# Patient Record
Sex: MALE | URBAN - METROPOLITAN AREA
[De-identification: names, ages, dates, MRNs, and addresses within clinical notes are randomized per-mention and may not be internally consistent; named-entity substitution may affect disease eponyms.]

---

## 2018-12-17 ENCOUNTER — OFFICE VISIT - HEALTHEAST (OUTPATIENT)
Dept: GERIATRICS | Facility: CLINIC | Age: 83
End: 2018-12-17

## 2018-12-17 DIAGNOSIS — N40.1 BENIGN PROSTATIC HYPERPLASIA WITH LOWER URINARY TRACT SYMPTOMS, SYMPTOM DETAILS UNSPECIFIED: ICD-10-CM

## 2018-12-17 DIAGNOSIS — I48.20 CHRONIC ATRIAL FIBRILLATION (H): ICD-10-CM

## 2018-12-17 DIAGNOSIS — H91.90 DECREASED HEARING, UNSPECIFIED LATERALITY: ICD-10-CM

## 2018-12-17 DIAGNOSIS — K27.9 PEPTIC ULCER DISEASE: ICD-10-CM

## 2018-12-17 DIAGNOSIS — S49.91XD INJURY OF RIGHT SHOULDER, SUBSEQUENT ENCOUNTER: ICD-10-CM

## 2018-12-17 DIAGNOSIS — Z96.652 HISTORY OF TOTAL KNEE ARTHROPLASTY, LEFT: ICD-10-CM

## 2018-12-17 DIAGNOSIS — K92.2 UPPER GI BLEED: ICD-10-CM

## 2018-12-17 DIAGNOSIS — R29.6 FREQUENT FALLS: ICD-10-CM

## 2018-12-17 DIAGNOSIS — D50.0 BLOOD LOSS ANEMIA: ICD-10-CM

## 2018-12-17 DIAGNOSIS — K21.9 GASTROESOPHAGEAL REFLUX DISEASE, ESOPHAGITIS PRESENCE NOT SPECIFIED: ICD-10-CM

## 2018-12-18 ENCOUNTER — AMBULATORY - HEALTHEAST (OUTPATIENT)
Dept: ADMINISTRATIVE | Facility: CLINIC | Age: 83
End: 2018-12-18

## 2018-12-18 RX ORDER — LEVOTHYROXINE SODIUM 125 UG/1
125 TABLET ORAL DAILY
Status: SHIPPED | COMMUNITY
Start: 2018-12-18

## 2018-12-18 RX ORDER — FUROSEMIDE 40 MG
40 TABLET ORAL
Status: SHIPPED | COMMUNITY
Start: 2018-12-18

## 2018-12-18 RX ORDER — BACLOFEN 20 MG
500 TABLET ORAL DAILY
Status: SHIPPED | COMMUNITY
Start: 2018-12-18

## 2018-12-18 RX ORDER — POTASSIUM CHLORIDE 1500 MG/1
20 TABLET, EXTENDED RELEASE ORAL DAILY
Status: SHIPPED | COMMUNITY
Start: 2018-12-18

## 2018-12-18 RX ORDER — METOPROLOL SUCCINATE 50 MG/1
50 TABLET, EXTENDED RELEASE ORAL AT BEDTIME
Status: SHIPPED | COMMUNITY
Start: 2018-12-18

## 2018-12-18 RX ORDER — PANTOPRAZOLE SODIUM 40 MG/1
40 TABLET, DELAYED RELEASE ORAL
Status: SHIPPED | COMMUNITY
Start: 2018-12-18

## 2018-12-18 RX ORDER — WARFARIN SODIUM 4 MG/1
TABLET ORAL SEE ADMIN INSTRUCTIONS
Status: SHIPPED | COMMUNITY
Start: 2018-12-18

## 2018-12-18 RX ORDER — PROBENECID 500 MG/1
500 TABLET, FILM COATED ORAL 2 TIMES DAILY
Status: SHIPPED | COMMUNITY
Start: 2018-12-18

## 2018-12-18 RX ORDER — SIMVASTATIN 20 MG
20 TABLET ORAL AT BEDTIME
Status: SHIPPED | COMMUNITY
Start: 2018-12-18

## 2018-12-18 RX ORDER — FERROUS SULFATE 325(65) MG
1 TABLET ORAL
Status: SHIPPED | COMMUNITY
Start: 2018-12-18

## 2018-12-18 RX ORDER — LISINOPRIL 40 MG/1
40 TABLET ORAL DAILY
Status: SHIPPED | COMMUNITY
Start: 2018-12-18

## 2018-12-18 RX ORDER — TERAZOSIN 2 MG/1
4 CAPSULE ORAL AT BEDTIME
Status: SHIPPED | COMMUNITY
Start: 2018-12-18

## 2018-12-18 RX ORDER — UBIDECARENONE 50 MG
400 CAPSULE ORAL 2 TIMES DAILY
Status: SHIPPED | COMMUNITY
Start: 2018-12-18

## 2018-12-19 ENCOUNTER — COMMUNICATION - HEALTHEAST (OUTPATIENT)
Dept: GERIATRICS | Facility: CLINIC | Age: 83
End: 2018-12-19

## 2018-12-19 RX ORDER — AMOXICILLIN 250 MG
1 CAPSULE ORAL 2 TIMES DAILY
Status: SHIPPED | COMMUNITY
Start: 2018-12-19

## 2018-12-20 ENCOUNTER — OFFICE VISIT - HEALTHEAST (OUTPATIENT)
Dept: GERIATRICS | Facility: CLINIC | Age: 83
End: 2018-12-20

## 2018-12-20 DIAGNOSIS — K21.9 GASTROESOPHAGEAL REFLUX DISEASE, ESOPHAGITIS PRESENCE NOT SPECIFIED: ICD-10-CM

## 2018-12-20 DIAGNOSIS — S49.91XD INJURY OF RIGHT SHOULDER, SUBSEQUENT ENCOUNTER: ICD-10-CM

## 2018-12-20 DIAGNOSIS — G47.00 INSOMNIA, UNSPECIFIED TYPE: ICD-10-CM

## 2018-12-20 DIAGNOSIS — N40.1 BENIGN PROSTATIC HYPERPLASIA WITH LOWER URINARY TRACT SYMPTOMS, SYMPTOM DETAILS UNSPECIFIED: ICD-10-CM

## 2018-12-20 DIAGNOSIS — I48.20 CHRONIC ATRIAL FIBRILLATION (H): ICD-10-CM

## 2018-12-20 DIAGNOSIS — K92.2 UPPER GI BLEED: ICD-10-CM

## 2018-12-26 ENCOUNTER — COMMUNICATION - HEALTHEAST (OUTPATIENT)
Dept: GERIATRICS | Facility: CLINIC | Age: 83
End: 2018-12-26

## 2018-12-26 ENCOUNTER — OFFICE VISIT - HEALTHEAST (OUTPATIENT)
Dept: GERIATRICS | Facility: CLINIC | Age: 83
End: 2018-12-26

## 2018-12-26 DIAGNOSIS — K92.2 UPPER GI BLEED: ICD-10-CM

## 2018-12-26 DIAGNOSIS — S49.91XD INJURY OF RIGHT SHOULDER, SUBSEQUENT ENCOUNTER: ICD-10-CM

## 2018-12-26 DIAGNOSIS — Z96.652 HISTORY OF TOTAL KNEE ARTHROPLASTY, LEFT: ICD-10-CM

## 2018-12-26 DIAGNOSIS — G47.9 SLEEP DISTURBANCE: ICD-10-CM

## 2018-12-26 DIAGNOSIS — I48.20 CHRONIC ATRIAL FIBRILLATION (H): ICD-10-CM

## 2018-12-28 ENCOUNTER — OFFICE VISIT - HEALTHEAST (OUTPATIENT)
Dept: GERIATRICS | Facility: CLINIC | Age: 83
End: 2018-12-28

## 2018-12-28 DIAGNOSIS — K27.9 PEPTIC ULCER DISEASE: ICD-10-CM

## 2018-12-28 DIAGNOSIS — G47.9 SLEEP DISTURBANCE: ICD-10-CM

## 2018-12-28 DIAGNOSIS — N40.1 BENIGN PROSTATIC HYPERPLASIA WITH LOWER URINARY TRACT SYMPTOMS, SYMPTOM DETAILS UNSPECIFIED: ICD-10-CM

## 2018-12-28 DIAGNOSIS — K21.9 GASTROESOPHAGEAL REFLUX DISEASE, ESOPHAGITIS PRESENCE NOT SPECIFIED: ICD-10-CM

## 2018-12-28 DIAGNOSIS — R29.6 FREQUENT FALLS: ICD-10-CM

## 2018-12-28 DIAGNOSIS — S49.91XD INJURY OF RIGHT SHOULDER, SUBSEQUENT ENCOUNTER: ICD-10-CM

## 2018-12-28 DIAGNOSIS — I48.20 CHRONIC ATRIAL FIBRILLATION (H): ICD-10-CM

## 2018-12-28 DIAGNOSIS — Z96.652 HISTORY OF TOTAL KNEE ARTHROPLASTY, LEFT: ICD-10-CM

## 2018-12-28 DIAGNOSIS — K92.2 UPPER GI BLEED: ICD-10-CM

## 2018-12-31 ENCOUNTER — AMBULATORY - HEALTHEAST (OUTPATIENT)
Dept: GERIATRICS | Facility: CLINIC | Age: 83
End: 2018-12-31

## 2021-06-02 VITALS — WEIGHT: 206.8 LBS

## 2021-06-22 NOTE — PROGRESS NOTES
Code Status:  POLST AVAILABLE  Visit Type: H & P     Facility:  [facility] [[id number]]          PCP:  Provider, No Primary Care  None       Admission Date to our Facility: 12/19/2018 Discharge Date from our Facility: 12/29/2018    Discharge Diagnosis:    1. Upper GI bleed     2. History of total knee arthroplasty, left     3. Sleep disturbance     4. Injury of right shoulder, subsequent encounter     5. Chronic atrial fibrillation (H)     6. Gastroesophageal reflux disease, esophagitis presence not specified     7. Benign prostatic hyperplasia with lower urinary tract symptoms, symptom details unspecified     8. Peptic ulcer disease     9. Frequent falls          History of Present Illness: Tristen Lopez is a 88 y.o. male seen today for discharge from TCU.  He has a medical history for BPH, atrial fib, frequent falls, anemia, hypertension, and osteoarthritis with a recent left TKA.  He was recently transferred from a hospital in [facility] after a peptic ulcer related GI bleed.  He does have a history of this occurring one year ago also.  Prior to that he had a planned left TKA which was uneventful and he was transferred to a TCU.  It sounds as if he was started on postop and this could have led to his acute bleeding.      Skilled Nursing Facility Course: While in the TCU to progressed with therapy to ambulating with a walker independent.  He has had ongoing issues with blood loss anemia with his last hemoglobin checked on 12/20 was 8.3.  There is an order to check CBC with differential on 12/31 and follow-up with primary.  He has not needed any pain while at the TCU for his left TKA or otherwise the incision is marginated and healed his potassium was slightly low at 3.4 on 12/20 so he was given of potassium this week and then down to his KCl 20 mEq daily.  During his stay he did have issues with sleeplessness and restless leg which was most likely due to his anemia he was started on  melatonin and that was increased to 6 mg this week.  Today he states his sleeplessness has improved and I told him he could probably stop the melatonin once he is stable at home.  He is already on an iron, magnesium, and potassium supplements.  He continues to have bilateral lower extremity trace edema and wears TAN hose.  He has chronic gout in which he is on probenecid.  For his hypertension he takes lisinopril, simvastatin and metoprolol for atrial fibrillation.  He also takes Lasix 40 mg daily.  He has not had any issues with bowel movements and does have as needed senna-S.  He also complained of decreased right shoulder range of motion without pain.  We had discussed possible need for an MRI of the shoulder but he declined at this time due to he is not having any pain.    Discharge Medications:    Current Outpatient Medications   Medication Sig Dispense Refill     co-enzyme Q-10 50 mg capsule Take 400 mg by mouth 2 (two) times a day.       ferrous sulfate 65 mg elemental iron Take 1 tablet by mouth daily with breakfast.       furosemide (LASIX) 40 MG tablet Take 40 mg by mouth daily with breakfast.       glucosamine HCl/chondroitin cross (GLUCOSAMINE-CHONDROITIN ORAL) Take 1 capsule by mouth 2 (two) times a day.       levothyroxine (SYNTHROID, LEVOTHROID) 125 MCG tablet Take 125 mcg by mouth Daily at 6:00 am.       lisinopril (PRINIVIL,ZESTRIL) 40 MG tablet Take 40 mg by mouth daily Hold if SBP < 110 .       magnesium oxide 500 mg Tab Take 500 mg by mouth daily.       metoprolol succinate (TOPROL-XL) 50 MG 24 hr tablet Take 50 mg by mouth at bedtime Hold if SBP < 100.  Hold if HR is < 60 bpm. .       multivit-minerals/ferrous fum (MULTI VITAMIN ORAL) Take 1 tablet by mouth daily.       pantoprazole (PROTONIX) 40 MG tablet Take 40 mg by mouth daily before breakfast Give 30 minutes one time a day before a meal .       potassium chloride (K-DUR,KLOR-CON) 20 MEQ tablet Take 20 mEq by mouth daily.       probenecid  (BENEMID) 500 mg tablet Take 500 mg by mouth 2 (two) times a day.       senna-docusate (SENNOSIDES-DOCUSATE SODIUM) 8.6-50 mg tablet Take 1 tablet by mouth 2 (two) times a day. Hold if loose stools       simvastatin (ZOCOR) 20 MG tablet Take 20 mg by mouth at bedtime.       sucralfate (CARAFATE) 100 mg/mL suspension Take 1 g by mouth 4 (four) times a day before meals and at bedtime Take on an empty stomach .       terazosin (HYTRIN) 2 MG capsule Take 4 mg by mouth at bedtime.       warfarin (COUMADIN/JANTOVEN) 4 MG tablet Take by mouth See Admin Instructions Take Coumadin oral tablet by mouth daily. Adjust dose based on INR results as directed.  Recheck INR on 12/20/18 .       No current facility-administered medications for this visit.        For most current and accurate medication list, please contact the skilled nursing facility that this patient visit took place at.      Discharge Plan: Home to [facility] and follow-up with his primary provider next week  Review of Systems   Patient denies fever, chills, headache, lightheadedness, dizziness, rhinorrhea, cough, congestion, shortness of breath, chest pain, palpitations, abdominal pain, n/v, diarrhea, constipation, change in appetite, dysuria, frequency, burning or pain with urination.  Otherwise review of systems are negative.         Physical Exam   Vital signs: Weight 204 pounds, /61, heart rate 85, respiratory 18, temp 98.0  GENERAL APPEARANCE: Well developed, well nourished, in no acute distress.  HEENT: normocephalic, atraumatic  PERRL, sclerae anicteric, pale conjunctivae and moist, EOM intact  NECK: Supple and symmetric. Trachea is midline, no thyromegaly, no adenopathy, and no tenderness  LUNGS: Lung sounds CTA, no adventitious sounds, respiratory effort normal.  CARD: RRR, S1, S2, without murmurs, gallops, rubs, no JVD, peripheral pulses 2+ and symmetric  ABD: Soft and nontender with normal bowel sounds.   MSK: Muscle strength and tone  were normal.  EXTREMITIES: BLE edema to knees  NEURO: Alert and oriented x 3. Normal affect. Sensation to touch was normal. Face is symmetric.  SKIN: Inspection of the skin reveals no rashes, ulcerations or petechiae.  PSYCH: euthymic          Labs:  All labs reviewed in the nursing home record.    Assessment:  1. Upper GI bleed     2. History of total knee arthroplasty, left     3. Sleep disturbance     4. Injury of right shoulder, subsequent encounter     5. Chronic atrial fibrillation (H)     6. Gastroesophageal reflux disease, esophagitis presence not specified     7. Benign prostatic hyperplasia with lower urinary tract symptoms, symptom details unspecified     8. Peptic ulcer disease     9. Frequent falls               DISCHARGE PLAN/FACE TO FACE:  I certify that services are/were furnished while this patient was under the care of a physician and that a physician or an allowed non-physician practitioner (NPP), had a face-to-face encounter that meets the physician face-to-face encounter requirements. The encounter was in whole, or in part, related to the primary reason for home health. The patient is confined to his/her home and needs intermittent skilled nursing, physical therapy, speech-language pathology, or the continued need for occupational therapy. A plan of care has been established by a physician and is periodically reviewed by a physician.    I certify that this patient is under my care and that I, or a nurse practitioner or physician's assistant working with me, had a face-to-face encounter that meets the physician face-to-face encounter requirements with this patient.   Date of Face-to-Face Encounter: 12/28/2018    I certify that, based on my findings, the following services are medically necessary home health services:    My clinical findings support the need for the above skilled services because: (Please write a brief narrative summary that describes what the RN, PT, SLP, or other services will  be doing in the home. A list of diagnoses in this section does not meet the CMS requirements.)  RN to continue medication management and teaching and to monitor her symptoms of persistent anemia.  PT/OT for ongoing strengthening and home safety    This patient is homebound because: (Please write a brief narrative summary describing the functional limitations as to why this patient is homebound and specifically what makes this patient homebound.)  It requires patient maximum effort in order to get into the community on a regular basis to have frequent therapy and medical appointments.    The patient is, or has been, under my care and I have initiated the establishment of the plan of care. This patient will be followed by a physician who will periodically review the plan of care.        Electronically signed by: Gema Easton CNP

## 2021-06-22 NOTE — PROGRESS NOTES
[facility]      Facility:    [facility] [[id number]]  Code Status: DNR      Chief Complaint/Reason for Visit:  Chief Complaint   Patient presents with     H & P       HPI:   Tristen is a 88 y.o. male who was recently hospitalized with acute upper GI bleeding secondary to peptic ulcer disease and GERD.  He has a history of this occurring about 1 year ago as well.  His hemoglobin in the hospital was at 8.1.  Just prior to this he had a left total knee arthroplasty.  He does have underlying medical conditions including atrial fibrillation for which she normally takes Coumadin 6 mg on 2 days of the week and 4 mg on all other days.  At the time of discharge from the hospital now he was placed on 2 mg daily.  He does have BPH.  He does have history of frequent falling and recently had a fall involving his right shoulder and he has had difficulty with range of motion since that time.  He does have decreased hearing.    Upon current review of systems he states that his right shoulder is not causing any pain but he just cannot lift it above shoulder level.  He is not having any more problems of intestinal issues.  He does have dark stool related to the iron he is taking but not tarry stool consistency.  He does not have abdominal pain or nausea and is not experiencing GERD symptoms.  His left total knee arthroplasty is doing well.  He does have some problems with edema of his lower extremities in general.  He is not experiencing fevers or chills.  He does not have nasal congestion or sore throat.  He does not have cough or shortness of breath.  Does not have chest pain or palpitations of the heart.  His blood pressure has been fluctuating from between 93 and 152 systolic with an average systolic blood pressure reading of 130.  His pulse is been on the average of 85.    Past Medical History:  Past Medical History:   Diagnosis Date     Acute upper GI hemorrhage 2018     Anemia       Anticoagulant long-term use      Atrial fibrillation (H)      BPH with urinary obstruction      Coagulopathy (H) 2018     Constipation      Disease of thyroid gland     hypothyroidism     Falls      Gastropathy 2018     GERD (gastroesophageal reflux disease)      Gout      History of recent blood transfusion 2018     Hypertension      Osteoarthritis      PUD (peptic ulcer disease)      Right shoulder pain      Upper GI bleed            Surgical History:  Past Surgical History:   Procedure Laterality Date     COLONOSCOPY       ESOPHAGOGASTRODUODENOSCOPY  2018     JOINT REPLACEMENT Left 2018    KNEE     TOTAL HIP ARTHROPLASTY Bilateral        Family History: Family history is not pertinent to chief complaint hand presenting problems.  Family History   Problem Relation Age of Onset     Heart disease Other      Diabetes Other      Hypertension Other      Hyperlipidemia Other        Social History:    Social History     Socioeconomic History     Marital status:      Spouse name: Not on file     Number of children: Not on file     Years of education: Not on file     Highest education level: Not on file   Social Needs     Financial resource strain: Not on file     Food insecurity - worry: Not on file     Food insecurity - inability: Not on file     Transportation needs - medical: Not on file     Transportation needs - non-medical: Not on file   Occupational History     Not on file   Tobacco Use     Smoking status: Former Smoker     Packs/day: 1.00     Years: 50.00     Pack years: 50.00     Tobacco comment: quit a while ago   Substance and Sexual Activity     Alcohol use: No     Frequency: Never     Drug use: No     Sexual activity: Not on file   Other Topics Concern     Not on file   Social History Narrative     Not on file          Review of Systems   All other systems reviewed and are negative.      Vitals:    12/16/18 0858   BP: (!) 152/91   Pulse: 72   Resp: 16   Temp: 96.9  F (36.1  C)   SpO2: 97%        Physical Exam   HENT:   Mouth/Throat: Oropharynx is clear and moist.   Eyes: Right eye exhibits no discharge. Left eye exhibits no discharge.   Neck: No JVD present. No thyromegaly present.   Cardiovascular: Normal heart sounds.   Pulmonary/Chest: Breath sounds normal. No respiratory distress.   Abdominal: Soft. Bowel sounds are normal. He exhibits no distension. There is no tenderness.   Musculoskeletal:   Decreased range of motion of his right shoulder above shoulder level.  There is no point tenderness.  His left total knee arthroplasty surgical site is healing well.  No warmth or redness or swelling.  There is bilateral lower extremity edema which is trace.   Lymphadenopathy:     He has no cervical adenopathy.   Neurological: He is alert.   Skin: Skin is warm and dry.   Psychiatric: He has a normal mood and affect.   Nursing note and vitals reviewed.      Medication List:  Current Outpatient Medications   Medication Sig     co-enzyme Q-10 50 mg capsule Take 400 mg by mouth 2 (two) times a day.     ferrous sulfate 65 mg elemental iron Take 1 tablet by mouth daily with breakfast.     furosemide (LASIX) 40 MG tablet Take 40 mg by mouth daily with breakfast.     glucosamine HCl/chondroitin cross (GLUCOSAMINE-CHONDROITIN ORAL) Take 1 capsule by mouth 2 (two) times a day.     levothyroxine (SYNTHROID, LEVOTHROID) 125 MCG tablet Take 125 mcg by mouth Daily at 6:00 am.     lisinopril (PRINIVIL,ZESTRIL) 40 MG tablet Take 40 mg by mouth daily Hold if SBP < 110 .     magnesium oxide 500 mg Tab Take 500 mg by mouth daily.     metoprolol succinate (TOPROL-XL) 50 MG 24 hr tablet Take 50 mg by mouth at bedtime Hold if SBP < 100.  Hold if HR is < 60 bpm. .     multivit-minerals/ferrous fum (MULTI VITAMIN ORAL) Take 1 tablet by mouth daily.     oxyCODONE-acetaminophen (PERCOCET/ENDOCET) 5-325 mg per tablet Take 1 tablet by mouth every 4 (four) hours as needed for pain.     pantoprazole (PROTONIX) 40 MG tablet Take 40 mg by  mouth daily before breakfast Give 30 minutes one time a day before a meal .     potassium chloride (K-DUR,KLOR-CON) 20 MEQ tablet Take 20 mEq by mouth daily.     probenecid (BENEMID) 500 mg tablet Take 500 mg by mouth 2 (two) times a day.     senna-docusate (SENNOSIDES-DOCUSATE SODIUM) 8.6-50 mg tablet Take 1 tablet by mouth 2 (two) times a day. Hold if loose stools     simvastatin (ZOCOR) 20 MG tablet Take 20 mg by mouth at bedtime.     sucralfate (CARAFATE) 100 mg/mL suspension Take 1 g by mouth 4 (four) times a day before meals and at bedtime Take on an empty stomach .     terazosin (HYTRIN) 2 MG capsule Take 4 mg by mouth at bedtime.     warfarin (COUMADIN/JANTOVEN) 4 MG tablet Take by mouth See Admin Instructions Take Coumadin oral tablet by mouth daily. Adjust dose based on INR results as directed.  Recheck INR on 12/20/18 .       Labs:  INR is 1.6.    Assessment:    ICD-10-CM    1. Upper GI bleed K92.2    2. Chronic atrial fibrillation (H) I48.2    3. Peptic ulcer disease K27.9    4. Blood loss anemia D50.0    5. Gastroesophageal reflux disease, esophagitis presence not specified K21.9    6. Benign prostatic hyperplasia with lower urinary tract symptoms, symptom details unspecified N40.1    7. History of total knee arthroplasty, left Z96.652    8. Injury of right shoulder, subsequent encounter S49.91XD    9. Frequent falls R29.6    10. Decreased hearing, unspecified laterality H91.90        Plan:  Coumadin 4 mg daily.  Check hemoglobin and INR on Thursday, 12/20.  MRI scan of right shoulder for possible rotator cuff injury and also orthopedic consultation.  Post void residuals for 3 days to see if there is a problem secondary to his BPH.  There is a desire for weight loss so dietitian will be consulted.  If able while he is here audiology consultation will be done.  His plans are to return to [place] but to do as many things here as possible before he returns home.  I reviewed with him his POLST.  He  definitely wants to be without heroic measures taken such as DNR/DNI, but I did ask that he revisit the question about limited care versus comfort cares with his daughter who is a nurse, because it would be my understanding from the cares he has received that he would fit the category of limited care which would be standard medical care and this would include IV therapies antibiotics.  Hospitalization would be included.  Knee-high TAN hose on in the morning and off at night      Electronically signed by: Luke Duke MD

## 2021-06-22 NOTE — PROGRESS NOTES
Code Status:  POLST AVAILABLE  Visit Type: Follow Up     Facility:  [facility] [[id number]]        Facility Type: SNF (Skilled Nursing Facility, TCU)    History of Present Illness: Tristen Lopez is a 88 y.o. male seen today for TCM follow-up.  He has a medical history for BPH, A. fib, frequent falls, anemia, hypertension, and osteoarthritis with a recent left TKA.  He was transferred to TCU from a  [facility] for which he has been states he is from.  He had a recent left TKA and was transferred to a TCU in which it was found he had an acute upper GI bleed and needed prehospitalization.  GI bleed was secondary to peptic ulcer disease and GERD.  He was transferred to the [facility] due to family is located here.  Today, his main complaint is decreased sleep in which she was started on melatonin 3 mg last week.  He does report he feels his sleep has improved since starting melatonin however it continues to be an issue of restlessness at night.  His last hemoglobin was 8.5.  He is on Coumadin for his afibrillation.  He had a potassium drawn last week which was slightly low at 3.4.  An MRI for his right shoulder decreased range of motion has been ordered but not set up at this time.  He wonders if it is necessary as he is not having pain and believes he has been motion is improving with therapy.    Review of Systems   Patient denies fever, chills, headache, lightheadedness, dizziness, rhinorrhea, cough, congestion, shortness of breath, chest pain, palpitations, abdominal pain, n/v, diarrhea, constipation, change in appetite, dysuria, frequency, burning or pain with urination.  Otherwise review of systems are negative.         Physical Exam   Vital signs: /60, 112, resp 15, 97.1 temp  GENERAL APPEARANCE: Well developed, well nourished, pleasant male in no acute distress.  HEENT: normocephalic, atraumatic  PERRL, sclerae anicteric, conjunctivae pink and moist, EOM intact  LUNGS:  Lung sounds CTA, no adventitious sounds, respiratory effort normal.  CARD: tachycardic with skipped beats. S1, S2, systolic murmur, no gallops, rubs, no JVD, peripheral pulses 2+ and symmetric  ABD: Soft and nontender with normal bowel sounds.   MSK: Muscle strength and tone were normal.  EXTREMITIES: No cyanosis, clubbing or edema.  NEURO: Alert and oriented x 3. Normal affect. Sensation to touch was normal. Face is symmetric.  SKIN: Inspection of the skin reveals no rashes, ulcerations or petechiae.  PSYCH: euthymic          Labs:  K 3.4 on 12/20  Hgb 8.5     Assessment:  1. Upper GI bleed     2. History of total knee arthroplasty, left     3. Sleep disturbance     4. Injury of right shoulder, subsequent encounter     5. Chronic atrial fibrillation (H)         Plan:  Upper GI bleed: Having no symptoms of GI bleeding.  Reports having symptoms of anemia in restless legs at night and headaches on and off.  We will continue to monitor  History of TKA: Pain well managed we will discontinue oxycodone as he has not used that while at the TCU, will add acetaminophen as needed for any pain  Sleep disturbance: Counseled on good sleep hygiene habits, will increase melatonin to 6 mg.  Counseled on timing of melatonin and affects.  Would consider trazodone as needed if melatonin does not help.  This could most possibly improved as hemoglobin improved.  Right shoulder: Patient is not having pain at this time and may reconsider an MRI of the shoulder when he returns to his home town of [facility]  Atrial fibrillation: He is tachycardic but controlled with intermittent skipped beats currently on metoprolol 50 mg.  Denies any symptoms we will continue with current dose.        Electronically signed by: Gema Easton, CNP

## 2021-06-22 NOTE — PROGRESS NOTES
[facility]    NAME:  Tristen Lopez             :  1930  MRN: [id number]  CODE STATUS:  POLST AVAILABLE    FACILITY:  [facility] [[id number]]       ROOM:   524  CHIEF COMPLAINT/REASON FOR VISIT:  Chief Complaint   Patient presents with     Problem Visit     Weakness and Insomnia     HISTORY OF PRESENT ILLNESS: Tristen Lopez is a 88 y.o. male with BPH, Atrial Fibrillation - on Coumadin, Frequent falls - limited ROM secondary to recent fall, Anemia, and Hypertension who was recently hospitalized with acute upper GI bleeding secondary to peptic ulcer disease and GERD.  He has a history of this occurring about 1 year ago as well. His hemoglobin in the hospital was at 8.1.  Just prior to this, he had a left total knee arthroplasty.       Past Medical History:   Diagnosis Date     Acute upper GI hemorrhage 2018     Anemia      Anticoagulant long-term use      Atrial fibrillation (H)      BPH with urinary obstruction      Coagulopathy (H) 2018     Constipation      Disease of thyroid gland     hypothyroidism     Falls      Gastropathy 2018     GERD (gastroesophageal reflux disease)      Gout      History of recent blood transfusion 2018     Hypertension      Osteoarthritis      PUD (peptic ulcer disease)      Right shoulder pain      Upper GI bleed      Past Surgical History:   Procedure Laterality Date     COLONOSCOPY       ESOPHAGOGASTRODUODENOSCOPY  2018     JOINT REPLACEMENT Left 2018    KNEE     TOTAL HIP ARTHROPLASTY Bilateral      Family History   Problem Relation Age of Onset     Heart disease Other      Diabetes Other      Hypertension Other      Hyperlipidemia Other      Social History     Socioeconomic History     Marital status:      Spouse name: Not on file     Number of children: Not on file     Years of education: Not on file     Highest education level: Not on file   Social Needs     Financial resource strain: Not on file     Food insecurity -  worry: Not on file     Food insecurity - inability: Not on file     Transportation needs - medical: Not on file     Transportation needs - non-medical: Not on file   Occupational History     Not on file   Tobacco Use     Smoking status: Former Smoker     Packs/day: 1.00     Years: 50.00     Pack years: 50.00     Tobacco comment: quit a while ago   Substance and Sexual Activity     Alcohol use: No     Frequency: Never     Drug use: No     Sexual activity: Not on file   Other Topics Concern     Not on file   Social History Narrative     Not on file     No Known Allergies  Current Outpatient Medications   Medication Sig Dispense Refill     co-enzyme Q-10 50 mg capsule Take 400 mg by mouth 2 (two) times a day.       ferrous sulfate 65 mg elemental iron Take 1 tablet by mouth daily with breakfast.       furosemide (LASIX) 40 MG tablet Take 40 mg by mouth daily with breakfast.       glucosamine HCl/chondroitin cross (GLUCOSAMINE-CHONDROITIN ORAL) Take 1 capsule by mouth 2 (two) times a day.       levothyroxine (SYNTHROID, LEVOTHROID) 125 MCG tablet Take 125 mcg by mouth Daily at 6:00 am.       lisinopril (PRINIVIL,ZESTRIL) 40 MG tablet Take 40 mg by mouth daily Hold if SBP < 110 .       magnesium oxide 500 mg Tab Take 500 mg by mouth daily.       metoprolol succinate (TOPROL-XL) 50 MG 24 hr tablet Take 50 mg by mouth at bedtime Hold if SBP < 100.  Hold if HR is < 60 bpm. .       multivit-minerals/ferrous fum (MULTI VITAMIN ORAL) Take 1 tablet by mouth daily.       oxyCODONE-acetaminophen (PERCOCET/ENDOCET) 5-325 mg per tablet Take 1 tablet by mouth every 4 (four) hours as needed for pain.       pantoprazole (PROTONIX) 40 MG tablet Take 40 mg by mouth daily before breakfast Give 30 minutes one time a day before a meal .       potassium chloride (K-DUR,KLOR-CON) 20 MEQ tablet Take 20 mEq by mouth daily.       probenecid (BENEMID) 500 mg tablet Take 500 mg by mouth 2 (two) times a day.       senna-docusate (SENNOSIDES-DOCUSATE  SODIUM) 8.6-50 mg tablet Take 1 tablet by mouth 2 (two) times a day. Hold if loose stools       simvastatin (ZOCOR) 20 MG tablet Take 20 mg by mouth at bedtime.       sucralfate (CARAFATE) 100 mg/mL suspension Take 1 g by mouth 4 (four) times a day before meals and at bedtime Take on an empty stomach .       terazosin (HYTRIN) 2 MG capsule Take 4 mg by mouth at bedtime.       warfarin (COUMADIN/JANTOVEN) 4 MG tablet Take by mouth See Admin Instructions Take Coumadin oral tablet by mouth daily. Adjust dose based on INR results as directed.  Recheck INR on 12/20/18 .       No current facility-administered medications for this visit.        REVIEW OF SYSTEMS:    Currently, no fever, chills, or rigors. Does not have any visual problems but does wear glasses.  Hard of hearing. Denies any chest pain, headaches, palpitations, lightheadedness, dizziness, shortness of breath, or cough. Appetite is good. Denies any GERD symptoms. Denies any difficulty with swallowing, nausea, or vomiting.  Denies any abdominal pain, diarrhea or constipation. Denies any urinary symptoms. Insomnia. No active bleeding. No rash.     PHYSICAL EXAMINATION:  Vitals:    12/20/18 1157   BP: 112/48   Pulse: 81   Resp: 15   Temp: 96.8  F (36  C)   SpO2: 98%   Weight: 206 lb 12.8 oz (93.8 kg)       GENERAL: Awake, Alert, oriented x3, not in any form of acute distress, answers questions appropriately, follows simple commands, conversant  HEENT: Head is normocephalic with normal hair distribution. No evidence of trauma. Ears: No acute purulent discharge. Eyes: Conjunctivae pink with no scleral jaundice. Nose: Normal mucosa and septum. NECK: Supple with no cervical or supraclavicular lymphadenopathy. Trachea is midline.   CHEST: No tenderness or deformity, no crepitus  LUNG: Clear to auscultation with good chest expansion. There are no crackles or wheezes, normal AP diameter.  BACK: No kyphosis of the thoracic spine. Symmetric, no curvature, ROM normal, no  CVA tenderness, no spinal tenderness   CVS: There is good S1  S2, there are no murmurs, rubs, gallops, or heaves, rhythm is regular,  2+ pulses symmetric in all extremities.  ABDOMEN: Globular and soft, nontender to palpation, non distended, no masses, no organomegaly, good bowel sounds, no rebound or guarding, no peritoneal signs.   EXTREMITIES:  Limited  range of motion of right upper extremity, there is no tenderness to palpation, no pedal edema, no cyanosis or clubbing, no calf tenderness.  Pulses equal in all extremities, normal cap refill, no joint swelling.  SKIN: Warm and dry, no erythema noted.  Skin color, texture, no rashes or lesions.  NEUROLOGICAL: The patient is oriented to person, place and time. Strength and sensation are grossly intact. Face is symmetric.    LABS:  All labs reviewed in the nursing home record.    ASSESSMENT/PLAN:    1. Upper GI bleed - Improved.  Will continue Ferrous sulfate daily   2. Chronic atrial fibrillation (H) - Anticoagulated on Coumadin   3. Gastroesophageal reflux disease, esophagitis presence not specified - Continue Protonix   4. Benign prostatic hyperplasia with lower urinary tract symptoms, symptom details unspecified -Continue Hytrin   5. Injury of right shoulder, subsequent encounter - Stable   6. Insomnia, unspecified type - Start Melatonin 3 mg at bedtime         Electronically signed by:  Brinaa Hernandez CNP    35 minutes TT of which 50% was spent in counseling and coordination of care of the above plan.    Time spent in interview and examination of patient, review of available records, and discussion with nursing staff. Continue care plan, efforts at therapy, and monitor nutritional status.

## 2024-01-28 NOTE — LETTER
Letter by Gema Easton CNP at      Author: Gema Easton CNP Service: -- Author Type: --    Filed:  Encounter Date: 12/28/2018 Status: (Other)         Patient: Tristen Lopez   MR Number: [id number]   YOB: 1930   Date of Visit: 12/28/2018     Code Status:  POLST AVAILABLE  Visit Type: H & P     Facility:  [facility] [[id number]]          PCP:  Provider, No Primary Care  None       Admission Date to our Facility: 12/19/2018 Discharge Date from our Facility: 12/29/2018    Discharge Diagnosis:    1. Upper GI bleed     2. History of total knee arthroplasty, left     3. Sleep disturbance     4. Injury of right shoulder, subsequent encounter     5. Chronic atrial fibrillation (H)     6. Gastroesophageal reflux disease, esophagitis presence not specified     7. Benign prostatic hyperplasia with lower urinary tract symptoms, symptom details unspecified     8. Peptic ulcer disease     9. Frequent falls          History of Present Illness: Tristen Lopez is a 88 y.o. male seen today for discharge from TCU.  He has a medical history for BPH, atrial fib, frequent falls, anemia, hypertension, and osteoarthritis with a recent left TKA.  He was recently transferred from a hospital in [facility] after a peptic ulcer related GI bleed.  He does have a history of this occurring one year ago also.  Prior to that he had a planned left TKA which was uneventful and he was transferred to a TCU.  It sounds as if he was started on postop and this could have led to his acute bleeding.      Skilled Nursing Facility Course: While in the TCU to progressed with therapy to ambulating with a walker independent.  He has had ongoing issues with blood loss anemia with his last hemoglobin checked on 12/20 was 8.3.  There is an order to check CBC with differential on 12/31 and follow-up with primary.  He has not needed any pain while at the TCU for his left TKA or otherwise the incision is marginated and  healed his potassium was slightly low at 3.4 on 12/20 so he was given of potassium this week and then down to his KCl 20 mEq daily.  During his stay he did have issues with sleeplessness and restless leg which was most likely due to his anemia he was started on melatonin and that was increased to 6 mg this week.  Today he states his sleeplessness has improved and I told him he could probably stop the melatonin once he is stable at home.  He is already on an iron, magnesium, and potassium supplements.  He continues to have bilateral lower extremity trace edema and wears TAN hose.  He has chronic gout in which he is on probenecid.  For his hypertension he takes lisinopril, simvastatin and metoprolol for atrial fibrillation.  He also takes Lasix 40 mg daily.  He has not had any issues with bowel movements and does have as needed senna-S.  He also complained of decreased right shoulder range of motion without pain.  We had discussed possible need for an MRI of the shoulder but he declined at this time due to he is not having any pain.    Discharge Medications:    Current Outpatient Medications   Medication Sig Dispense Refill   ? co-enzyme Q-10 50 mg capsule Take 400 mg by mouth 2 (two) times a day.     ? ferrous sulfate 65 mg elemental iron Take 1 tablet by mouth daily with breakfast.     ? furosemide (LASIX) 40 MG tablet Take 40 mg by mouth daily with breakfast.     ? glucosamine HCl/chondroitin cross (GLUCOSAMINE-CHONDROITIN ORAL) Take 1 capsule by mouth 2 (two) times a day.     ? levothyroxine (SYNTHROID, LEVOTHROID) 125 MCG tablet Take 125 mcg by mouth Daily at 6:00 am.     ? lisinopril (PRINIVIL,ZESTRIL) 40 MG tablet Take 40 mg by mouth daily Hold if SBP < 110 .     ? magnesium oxide 500 mg Tab Take 500 mg by mouth daily.     ? metoprolol succinate (TOPROL-XL) 50 MG 24 hr tablet Take 50 mg by mouth at bedtime Hold if SBP < 100.  Hold if HR is < 60 bpm. .     ? multivit-minerals/ferrous fum (MULTI VITAMIN ORAL) Take 1  tablet by mouth daily.     ? pantoprazole (PROTONIX) 40 MG tablet Take 40 mg by mouth daily before breakfast Give 30 minutes one time a day before a meal .     ? potassium chloride (K-DUR,KLOR-CON) 20 MEQ tablet Take 20 mEq by mouth daily.     ? probenecid (BENEMID) 500 mg tablet Take 500 mg by mouth 2 (two) times a day.     ? senna-docusate (SENNOSIDES-DOCUSATE SODIUM) 8.6-50 mg tablet Take 1 tablet by mouth 2 (two) times a day. Hold if loose stools     ? simvastatin (ZOCOR) 20 MG tablet Take 20 mg by mouth at bedtime.     ? sucralfate (CARAFATE) 100 mg/mL suspension Take 1 g by mouth 4 (four) times a day before meals and at bedtime Take on an empty stomach .     ? terazosin (HYTRIN) 2 MG capsule Take 4 mg by mouth at bedtime.     ? warfarin (COUMADIN/JANTOVEN) 4 MG tablet Take by mouth See Admin Instructions Take Coumadin oral tablet by mouth daily. Adjust dose based on INR results as directed.  Recheck INR on 12/20/18 .       No current facility-administered medications for this visit.        For most current and accurate medication list, please contact the skilled nursing facility that this patient visit took place at.      Discharge Plan: Home to [facility] and follow-up with his primary provider next week  Review of Systems   Patient denies fever, chills, headache, lightheadedness, dizziness, rhinorrhea, cough, congestion, shortness of breath, chest pain, palpitations, abdominal pain, n/v, diarrhea, constipation, change in appetite, dysuria, frequency, burning or pain with urination.  Otherwise review of systems are negative.         Physical Exam   Vital signs: Weight 204 pounds, /61, heart rate 85, respiratory 18, temp 98.0  GENERAL APPEARANCE: Well developed, well nourished, in no acute distress.  HEENT: normocephalic, atraumatic  PERRL, sclerae anicteric, pale conjunctivae and moist, EOM intact  NECK: Supple and symmetric. Trachea is midline, no thyromegaly, no adenopathy, and no  tenderness  LUNGS: Lung sounds CTA, no adventitious sounds, respiratory effort normal.  CARD: RRR, S1, S2, without murmurs, gallops, rubs, no JVD, peripheral pulses 2+ and symmetric  ABD: Soft and nontender with normal bowel sounds.   MSK: Muscle strength and tone were normal.  EXTREMITIES: BLE edema to knees  NEURO: Alert and oriented x 3. Normal affect. Sensation to touch was normal. Face is symmetric.  SKIN: Inspection of the skin reveals no rashes, ulcerations or petechiae.  PSYCH: euthymic          Labs:  All labs reviewed in the nursing home record.    Assessment:  1. Upper GI bleed     2. History of total knee arthroplasty, left     3. Sleep disturbance     4. Injury of right shoulder, subsequent encounter     5. Chronic atrial fibrillation (H)     6. Gastroesophageal reflux disease, esophagitis presence not specified     7. Benign prostatic hyperplasia with lower urinary tract symptoms, symptom details unspecified     8. Peptic ulcer disease     9. Frequent falls               DISCHARGE PLAN/FACE TO FACE:  I certify that services are/were furnished while this patient was under the care of a physician and that a physician or an allowed non-physician practitioner (NPP), had a face-to-face encounter that meets the physician face-to-face encounter requirements. The encounter was in whole, or in part, related to the primary reason for home health. The patient is confined to his/her home and needs intermittent skilled nursing, physical therapy, speech-language pathology, or the continued need for occupational therapy. A plan of care has been established by a physician and is periodically reviewed by a physician.    I certify that this patient is under my care and that I, or a nurse practitioner or physician's assistant working with me, had a face-to-face encounter that meets the physician face-to-face encounter requirements with this patient.   Date of Face-to-Face Encounter: 12/28/2018    I certify that, based on  my findings, the following services are medically necessary home health services:    My clinical findings support the need for the above skilled services because: (Please write a brief narrative summary that describes what the RN, PT, SLP, or other services will be doing in the home. A list of diagnoses in this section does not meet the CMS requirements.)  RN to continue medication management and teaching and to monitor her symptoms of persistent anemia.  PT/OT for ongoing strengthening and home safety    This patient is homebound because: (Please write a brief narrative summary describing the functional limitations as to why this patient is homebound and specifically what makes this patient homebound.)  It requires patient maximum effort in order to get into the community on a regular basis to have frequent therapy and medical appointments.    The patient is, or has been, under my care and I have initiated the establishment of the plan of care. This patient will be followed by a physician who will periodically review the plan of care.        Electronically signed by: Gema Easton CNP           Self